# Patient Record
(demographics unavailable — no encounter records)

---

## 2025-01-08 NOTE — ASSESSMENT
[FreeTextEntry1] : 29 yo female evaluated for bladder wall thickening.  Bladder ultrasound images reviewed and discussed with patient. Informed patient that her bladder wall thickness is within normal limits. Reassurance provided to patient.  Follow-up as needed.

## 2025-01-08 NOTE — HISTORY OF PRESENT ILLNESS
[FreeTextEntry1] : 29 yo female is a new patient here for bladder wall thickening.   She states she was getting an abdominal ultrasound and was incidentally found to have a bladder polyp, so a dedicated bladder ultrasound was obtained. She reports that the bladder ultrasound showed bladder wall thickening and was recommended by her PCP to follow-up with a urologist. Patient states there has been no changes to her usual urinary habits.  Denies dysuria, sensation of incomplete bladder emptying, gross hematuria.   Reviewed bladder ultrasound images and discussed with patient. Bladder ultrasound 10/16/2024: URINARY BLADDER: Bladder wall thickening up to 0.4 cm. No debris or calculus is seen. Bilateral ureteral jets are visualized. -PREVOID VOLUME: 462 mL. -POSTVOID VOLUME: 9 mL.  Current smoker and vaper. Smokes 4 cigarettes per day x10 years.  Occupation: